# Patient Record
Sex: MALE | Race: BLACK OR AFRICAN AMERICAN | NOT HISPANIC OR LATINO | RURAL
[De-identification: names, ages, dates, MRNs, and addresses within clinical notes are randomized per-mention and may not be internally consistent; named-entity substitution may affect disease eponyms.]

---

## 2020-07-08 ENCOUNTER — OFFICE VISIT (OUTPATIENT)
Dept: FAMILY MEDICINE CLINIC | Facility: CLINIC | Age: 25
End: 2020-07-08

## 2020-07-08 VITALS
WEIGHT: 155.5 LBS | OXYGEN SATURATION: 96 % | HEART RATE: 75 BPM | DIASTOLIC BLOOD PRESSURE: 76 MMHG | HEIGHT: 74 IN | TEMPERATURE: 98.3 F | SYSTOLIC BLOOD PRESSURE: 110 MMHG | RESPIRATION RATE: 20 BRPM | BODY MASS INDEX: 19.96 KG/M2

## 2020-07-08 DIAGNOSIS — L73.0 KELOIDAL FOLLICULITIS: ICD-10-CM

## 2020-07-08 DIAGNOSIS — L23.2 ALLERGIC CONTACT DERMATITIS DUE TO COSMETICS: ICD-10-CM

## 2020-07-08 DIAGNOSIS — J45.30 MILD PERSISTENT ASTHMA WITHOUT COMPLICATION: Primary | ICD-10-CM

## 2020-07-08 PROCEDURE — 99202 OFFICE O/P NEW SF 15 MIN: CPT | Performed by: NURSE PRACTITIONER

## 2020-07-08 RX ORDER — METHYLPREDNISOLONE 4 MG/1
TABLET ORAL
Qty: 1 EACH | Refills: 0 | Status: SHIPPED | OUTPATIENT
Start: 2020-07-08

## 2020-07-08 RX ORDER — ALBUTEROL SULFATE 90 UG/1
2 AEROSOL, METERED RESPIRATORY (INHALATION) EVERY 4 HOURS PRN
Qty: 18 G | Refills: 0 | Status: SHIPPED | OUTPATIENT
Start: 2020-07-08

## 2020-07-08 RX ORDER — MONTELUKAST SODIUM 10 MG/1
10 TABLET ORAL NIGHTLY
Qty: 30 TABLET | Refills: 2 | Status: SHIPPED | OUTPATIENT
Start: 2020-07-08

## 2020-07-08 NOTE — PATIENT INSTRUCTIONS
Contact Dermatitis  Dermatitis is redness, soreness, and swelling (inflammation) of the skin. Contact dermatitis is a reaction to something that touches the skin.  There are two types of contact dermatitis:  · Irritant contact dermatitis. This happens when something bothers (irritates) your skin, like soap.  · Allergic contact dermatitis. This is caused when you are exposed to something that you are allergic to, such as poison ivy.  What are the causes?  · Common causes of irritant contact dermatitis include:  ? Makeup.  ? Soaps.  ? Detergents.  ? Bleaches.  ? Acids.  ? Metals, such as nickel.  · Common causes of allergic contact dermatitis include:  ? Plants.  ? Chemicals.  ? Jewelry.  ? Latex.  ? Medicines.  ? Preservatives in products, such as clothing.  What increases the risk?  · Having a job that exposes you to things that bother your skin.  · Having asthma or eczema.  What are the signs or symptoms?  Symptoms may happen anywhere the irritant has touched your skin. Symptoms include:  · Dry or flaky skin.  · Redness.  · Cracks.  · Itching.  · Pain or a burning feeling.  · Blisters.  · Blood or clear fluid draining from skin cracks.  With allergic contact dermatitis, swelling may occur. This may happen in places such as the eyelids, mouth, or genitals.  How is this treated?  · This condition is treated by checking for the cause of the reaction and protecting your skin. Treatment may also include:  ? Steroid creams, ointments, or medicines.  ? Antibiotic medicines or other ointments, if you have a skin infection.  ? Lotion or medicines to help with itching.  ? A bandage (dressing).  Follow these instructions at home:  Skin care  · Moisturize your skin as needed.  · Put cool cloths on your skin.  · Put a baking soda paste on your skin. Stir water into baking soda until it looks like a paste.  · Do not scratch your skin.  · Avoid having things rub up against your skin.  · Avoid the use of soaps, perfumes, and  dyes.  Medicines  · Take or apply over-the-counter and prescription medicines only as told by your doctor.  · If you were prescribed an antibiotic medicine, take or apply it as told by your doctor. Do not stop using it even if your condition starts to get better.  Bathing  · Take a bath with:  ? Epsom salts.  ? Baking soda.  ? Colloidal oatmeal.  · Bathe less often.  · Bathe in warm water. Avoid using hot water.  Bandage care  · If you were given a bandage, change it as told by your health care provider.  · Wash your hands with soap and water before and after you change your bandage. If soap and water are not available, use hand .  General instructions  · Avoid the things that caused your reaction. If you do not know what caused it, keep a journal. Write down:  ? What you eat.  ? What skin products you use.  ? What you drink.  ? What you wear in the area that has symptoms. This includes jewelry.  · Check the affected areas every day for signs of infection. Check for:  ? More redness, swelling, or pain.  ? More fluid or blood.  ? Warmth.  ? Pus or a bad smell.  · Keep all follow-up visits as told by your doctor. This is important.  Contact a doctor if:  · You do not get better with treatment.  · Your condition gets worse.  · You have signs of infection, such as:  ? More swelling.  ? Tenderness.  ? More redness.  ? Soreness.  ? Warmth.  · You have a fever.  · You have new symptoms.  Get help right away if:  · You have a very bad headache.  · You have neck pain.  · Your neck is stiff.  · You throw up (vomit).  · You feel very sleepy.  · You see red streaks coming from the area.  · Your bone or joint near the area hurts after the skin has healed.  · The area turns darker.  · You have trouble breathing.  Summary  · Dermatitis is redness, soreness, and swelling of the skin.  · Symptoms may occur where the irritant has touched you.  · Treatment may include medicines and skin care.  · If you do not know what caused  your reaction, keep a journal.  · Contact a doctor if your condition gets worse or you have signs of infection.  This information is not intended to replace advice given to you by your health care provider. Make sure you discuss any questions you have with your health care provider.  Document Released: 10/15/2010 Document Revised: 04/08/2020 Document Reviewed: 07/03/2019  [employer]vier Patient Education © 2020 [employer]     negative...

## 2020-07-08 NOTE — PROGRESS NOTES
"Kenyatta Dickey is a 25 y.o. male.     [facility] Same Day Appointment    PCP: none listed--previously seen by JOANA Donovan, but not seen in years    CC: \"rash on groin\"    Has history of asthma, needing refill on inhaler.  Reports he usually uses daily, mostly needed with activity.  Smoker, but only occasionally.     C/O folliculitis to back of scalp.  Treated with oral meds and topical within the last few months, but no improvement.     Denies hx of HSV.  Was recently incarcerated and reports he had full STD panel that was negative.         Rash   This is a new problem. The current episode started in the past 7 days. The problem has been gradually improving since onset. Location: genital area. The rash is characterized by itchiness. He was exposed to a new detergent/soap (usually uses Dove Mens bodywash, but used Old Spice). Associated symptoms include shortness of breath ( at times). Pertinent negatives include no anorexia, congestion, cough, diarrhea, eye pain, facial edema, fatigue, fever, joint pain, nail changes, rhinorrhea, sore throat or vomiting. Past treatments include nothing. The treatment provided mild (has improved on its own) relief. His past medical history is significant for allergies and asthma.   Asthma   He complains of shortness of breath ( at times) and wheezing ( at times). There is no cough. Pertinent negatives include no fever, rhinorrhea or sore throat. His past medical history is significant for asthma.        The following portions of the patient's history were reviewed and updated as appropriate: allergies, current medications, past medical history, past social history, past surgical history and problem list.    Review of Systems   Constitutional: Negative.  Negative for fatigue and fever.   HENT: Negative for congestion, rhinorrhea and sore throat.    Eyes: Negative for pain.   Respiratory: Positive for chest tightness ( at times), shortness of breath ( at times) and " "wheezing ( at times). Negative for cough.    Cardiovascular: Negative.    Gastrointestinal: Negative for anorexia, diarrhea, nausea and vomiting.   Genitourinary: Negative for difficulty urinating.   Musculoskeletal: Negative.  Negative for joint pain.   Skin: Positive for rash and skin lesions. Negative for nail changes.   Neurological: Negative for dizziness and headache.     /76 (BP Location: Right arm, Patient Position: Sitting, Cuff Size: Adult)   Pulse 75   Temp 98.3 °F (36.8 °C) (Temporal)   Resp 20   Ht 188 cm (74\")   Wt 70.5 kg (155 lb 8 oz)   SpO2 96%   BMI 19.97 kg/m²     Objective   Physical Exam   Constitutional: He is oriented to person, place, and time. He appears well-developed and well-nourished. No distress.   Cardiovascular: Normal rate and regular rhythm.   Pulmonary/Chest: Effort normal. He has decreased breath sounds ( slightly). He has wheezes ( tight, wheezy cough). He has no rhonchi. He has no rales.   Neurological: He is alert and oriented to person, place, and time.   Skin: Skin is warm and dry. Lesion and rash noted.        Nursing note and vitals reviewed.    No results found for this or any previous visit (from the past 24 hour(s)).  No Images in the past 120 days found..      Assessment/Plan   Yaz was seen today for rash.    Diagnoses and all orders for this visit:    Mild persistent asthma without complication  -     albuterol sulfate  (90 Base) MCG/ACT inhaler; Inhale 2 puffs Every 4 (Four) Hours As Needed for Wheezing or Shortness of Air.  -     montelukast (Singulair) 10 MG tablet; Take 1 tablet by mouth Every Night.    Allergic contact dermatitis due to cosmetics  -     methylPREDNISolone (MEDROL, EDUAR,) 4 MG tablet; Take as directed on package instructions.    Keloidal folliculitis  -     Ambulatory Referral to Dermatology      Refill of Albuterol for asthma.  Recommended starting daily Singulair for better control.   Smoking cessation encouraged.   See " PCP for ongoing treatment/management of Asthma.     Rx for Medrol pack for contact dermatitis.    Switch back to using Dove bodywash.     Referral to dermatology for keloids.     See PCP or RTC if symptoms persist/worsen  See PCP for routine f/u visit and management of chronic medical conditions      This document has been electronically signed by JOANA Parham on July 8, 2020 09:39,.